# Patient Record
Sex: FEMALE | Race: BLACK OR AFRICAN AMERICAN | NOT HISPANIC OR LATINO | URBAN - METROPOLITAN AREA
[De-identification: names, ages, dates, MRNs, and addresses within clinical notes are randomized per-mention and may not be internally consistent; named-entity substitution may affect disease eponyms.]

---

## 2022-12-15 ENCOUNTER — EMERGENCY (EMERGENCY)
Facility: HOSPITAL | Age: 32
LOS: 1 days | Discharge: HOME | End: 2022-12-15
Attending: EMERGENCY MEDICINE | Admitting: EMERGENCY MEDICINE

## 2022-12-15 VITALS
TEMPERATURE: 98 F | RESPIRATION RATE: 18 BRPM | DIASTOLIC BLOOD PRESSURE: 60 MMHG | OXYGEN SATURATION: 100 % | SYSTOLIC BLOOD PRESSURE: 110 MMHG | HEART RATE: 71 BPM

## 2022-12-15 LAB
HCT VFR BLD CALC: 29.7 % — LOW (ref 34.5–45)
HGB BLD-MCNC: 9.1 G/DL — LOW (ref 11.5–15.5)
MCHC RBC-ENTMCNC: 25.5 PG — LOW (ref 27–34)
MCHC RBC-ENTMCNC: 30.6 GM/DL — LOW (ref 32–36)
MCV RBC AUTO: 83.2 FL — SIGNIFICANT CHANGE UP (ref 80–100)
NRBC # BLD: 0 /100 WBCS — SIGNIFICANT CHANGE UP (ref 0–0)
NRBC # FLD: 0 K/UL — SIGNIFICANT CHANGE UP (ref 0–0)
PLATELET # BLD AUTO: 266 K/UL — SIGNIFICANT CHANGE UP (ref 150–400)
RBC # BLD: 3.57 M/UL — LOW (ref 3.8–5.2)
RBC # FLD: 16.2 % — HIGH (ref 10.3–14.5)
WBC # BLD: 7.1 K/UL — SIGNIFICANT CHANGE UP (ref 3.8–10.5)
WBC # FLD AUTO: 7.1 K/UL — SIGNIFICANT CHANGE UP (ref 3.8–10.5)

## 2022-12-15 PROCEDURE — 99284 EMERGENCY DEPT VISIT MOD MDM: CPT

## 2022-12-15 NOTE — ED PROVIDER NOTE - PATIENT PORTAL LINK FT
You can access the FollowMyHealth Patient Portal offered by [facility] by registering at the following website: [email or web address]. By joining [employer]’s FollowMyHealth portal, you will also be able to view your health information using other applications (apps) compatible with our system.

## 2022-12-15 NOTE — ED PROVIDER NOTE - ATTENDING APP SHARED VISIT CONTRIBUTION OF CARE
Attending note:   After face to face evaluation of this patient, I concur with above noted hx, pe, and care plan for this patient.  Gardner: 32-year-old female presents to the ED with few days of vaginal bleeding.  Patient 4 days ago had hormonal IUD removed.  Patient has since had heavier than normal bleeding with some mild cramping.  Today patient felt lightheaded and dizzy and was going through multiple tampons so decided to come to the ED.  Based on the recommendation of [facility].  On exam patient is not pale and vitals are stable.  Patient has no significant suprapubic or abdominal tenderness.  Pelvic exam performed by SHWETHA Geronimo chaperoned by myself.  Shows small amount of bleeding in the vaginal canal with some small amount of bleeding from the cervical os.  Blood is dark and scant.  Given patient feeling lightheaded and dizzy can check CBC.  Patient symptoms are likely due to removal of hormonal IUD.  Patient to follow-up with GYN if bleeding or other symptoms continue for more than 1 week.

## 2022-12-15 NOTE — ED PROVIDER NOTE - PROGRESS NOTE DETAILS
SHWETHA Geronimo- Pt made aware of anemia on cbc. advised to f/u with PMD. strict return precautions discussed with pt.

## 2022-12-15 NOTE — ED PROVIDER NOTE - OBJECTIVE STATEMENT
33 y/o female no pmh c/o vaginal bleeding x 2 days. Pt states that she had her IUD removed x 4 days ago. IUD was a hormone IUD. Pt started to bleed x1 day ago which became heavier today. Pt admits to bleeding through 2 tampons and 1 pad today. Pt felt generalized weakness today. Called the GYN clinic where the IUD was removed and the nurse told her to come to the ER. Pt denies chest pain, sob, n/v/d, numbness, tingling, dizziness, syncope, fever or chills.

## 2022-12-15 NOTE — ED PROVIDER NOTE - CLINICAL SUMMARY MEDICAL DECISION MAKING FREE TEXT BOX
31 y/o female w/ vaginal bleeding after removal of hormonal IUD- bleeding minimal on exam, likely related to hormalon change from removal of IUD. Will check CBC and rec to f/u with GYN

## 2022-12-15 NOTE — ED PROVIDER NOTE - NS ED ATTENDING STATEMENT MOD
This was a shared visit with the IV[facility]N. I reviewed and verified the documentation and independently performed the documented:

## 2022-12-15 NOTE — ED ADULT NURSE NOTE - CHIEF COMPLAINT QUOTE
Pt states had IUD removed this past Monday, developed spotting of blood on Tuesday but yesterday into todat co increased bleeding, states went thought two tampons. Pt co lower pelvic pain and weakness. Pt denies sob or chest pain.
detailed exam

## 2022-12-15 NOTE — ED ADULT TRIAGE NOTE - CHIEF COMPLAINT QUOTE
Pt states had IUD removed this past Monday, developed spotting of blood on Tuesday but yesterday into todat co increased bleeding, states went thought two tampons. Pt co lower pelvic pain and weakness. Pt denies sob or chest pain.

## 2023-01-22 ENCOUNTER — EMERGENCY (EMERGENCY)
Facility: HOSPITAL | Age: 33
LOS: 1 days | Discharge: HOME | End: 2023-01-22
Attending: EMERGENCY MEDICINE | Admitting: EMERGENCY MEDICINE
Payer: MEDICAID

## 2023-01-22 VITALS
SYSTOLIC BLOOD PRESSURE: 120 MMHG | DIASTOLIC BLOOD PRESSURE: 66 MMHG | OXYGEN SATURATION: 100 % | RESPIRATION RATE: 17 BRPM | TEMPERATURE: 98 F | HEART RATE: 67 BPM

## 2023-01-22 VITALS
RESPIRATION RATE: 16 BRPM | TEMPERATURE: 99 F | OXYGEN SATURATION: 100 % | DIASTOLIC BLOOD PRESSURE: 78 MMHG | SYSTOLIC BLOOD PRESSURE: 133 MMHG | HEART RATE: 61 BPM

## 2023-01-22 DIAGNOSIS — Z90.49 ACQUIRED ABSENCE OF OTHER SPECIFIED PARTS OF DIGESTIVE TRACT: Chronic | ICD-10-CM

## 2023-01-22 DIAGNOSIS — Z90.3 ACQUIRED ABSENCE OF STOMACH [PART OF]: Chronic | ICD-10-CM

## 2023-01-22 DIAGNOSIS — Z98.890 OTHER SPECIFIED POSTPROCEDURAL STATES: Chronic | ICD-10-CM

## 2023-01-22 PROCEDURE — 99284 EMERGENCY DEPT VISIT MOD MDM: CPT

## 2023-01-22 PROCEDURE — 74176 CT ABD & PELVIS W/O CONTRAST: CPT | Mod: 26,MA

## 2023-01-22 NOTE — ED PROVIDER NOTE - PATIENT PORTAL LINK FT
You can access the FollowMyHealth Patient Portal offered by [facility] by registering at the following website: [email or web address]. By joining [employer]’s FollowMyHealth portal, you will also be able to view your health information using other applications (apps) compatible with our system.

## 2023-01-22 NOTE — ED PROVIDER NOTE - PHYSICAL EXAMINATION
Well appearing, well nourished, awake, alert, oriented to person, place, time/situation and in no apparent distress.    Airway patent    Eyes without scleral injection. No jaundice.    Strong pulse.    Respirations unlabored.    Abdomen soft, non-tender, no guarding.    Spine appears normal, range of motion is not limited, no muscle or joint tenderness.  : chaperoned by Dr. Figueroa. scant blood, no IUD string felt, no CMT, no adnexal TTP.     Alert and oriented, no gross motor or sensory deficits.    Skin normal color for race, warm, dry and intact. No evidence of rash.

## 2023-01-22 NOTE — ED PROVIDER NOTE - NSFOLLOWUPINSTRUCTIONS_ED_ALL_ED_FT
Follow-up with your primary medical doctor.  Follow-up with your OB/GYN.  Return to the ER for any worsening pain, fevers, vomiting or any other concerning symptoms.

## 2023-01-22 NOTE — ED PROVIDER NOTE - OBJECTIVE STATEMENT
32-year-old female with PMHX of sciatica, past surgical history of a gastric sleeve, abdominoplasty, appendectomy presents to the ER concern that she had a IUD dislodged or still in place after she was told it was removed on December 12.  Patient reports she received an outpatient x-ray for her sciatic pain and was told there may be an IUD still in place.  Patient reports slight bleeding since the procedure on December 12 intermittently.  And intermittent right pelvic sticking pain.  Denies fevers, nausea, vomiting, diarrhea, constipation, dysuria, hematuria, abnormal vaginal discharge.

## 2023-01-22 NOTE — ED ADULT NURSE NOTE - OBJECTIVE STATEMENT
Received patient in Intake 6 c/o IUD showed in X-ray and patient had it removed it last month. Patient denies fever, chills, SOB, chest pain. Patient is A&Ox4, airway patent, breathing unlabored and even. Awaiting CT scan. Side rails up and safety maintained.

## 2023-01-22 NOTE — ED PROVIDER NOTE - PROGRESS NOTE DETAILS
CT scan impression no IUD visualized.  Surgical clips in the lower quadrant likely related to prior appendectomy and possible confused for intrauterine device and plain radiograph.

## 2023-01-22 NOTE — ED ADULT TRIAGE NOTE - CHIEF COMPLAINT QUOTE
states" I still have IUD  in me and I found out incidentally when I went to get X-ray taken for my sciatica," I was bleeding heavy in December and my IUD was removed on december 12 at [facility]. .c/o sciatica pain

## 2023-01-22 NOTE — ED PROVIDER NOTE - CLINICAL SUMMARY MEDICAL DECISION MAKING FREE TEXT BOX
Pam Figueroa MD attending physician.  Patient is a 32-year-old woman who comes into the emergency department with the complaint that she had an x-ray that showed she still had an IUD when she thought it had been removed.  She showed us a film that shows an IUD on the right side of the abdomen.  Patient has a benign exam soft abdomen no CMT no string from the uterus.  She denies any other complaints no fever abdominal pain issues with her stool or urine.  She does states she has had ongoing bleeding.  She was told at [facility] that they had taken out the IUD so she is concerned that it remains.  We discussed various approaches and that we do not remove the IUD in the emergency department.  We are able to offer a CAT scan to better localize the IUD so she can have that information if she so desires for outpatient planning.  She agreed to wait for the CAT scan.  I do not think it is required giving that she has a benign exam so if she decides to not wait that is okay to.  I am signing her out to the next team.  CT is pending.  We will also make sure she has a pregnancy test

## 2023-01-22 NOTE — ED PROVIDER NOTE - ATTENDING APP SHARED VISIT CONTRIBUTION OF CARE
Pam Figueroa MD attending physician.  Patient is a 32-year-old woman who comes into the emergency department with the complaint that she had an x-ray that showed she still had an IUD when she thought it had been removed.  She showed us a film that shows an IUD on the right side of the abdomen.  Patient has a benign exam soft abdomen no CMT no string from the uterus.  She denies any other complaints no fever abdominal pain issues with her stool or urine.  She does states she has had ongoing bleeding.  She was told at [facility] that they had taken out the IUD so she is concerned that it remains.  We discussed various approaches and that we do not remove the IUD in the emergency department.  We are able to offer a CAT scan to better localize the IUD so she can have that information if she so desires for outpatient planning.  She agreed to wait for the CAT scan.  I do not think it is required giving that she has a benign exam so if she decides to not wait that is okay to.  I am signing her out to the next team.  CT is pending.  We will also make sure she has a pregnancy test    Pam Figueroa MD attending physician.  I attest I performed a history and physical of the patient and discussed their management with the resident and or advanced care provider.  I reviewed the resident and/or ACP's note and agree with the documented findings and plan of care with the exception of my documented changes if any.  My medical decision making and observations are found above.

## 2023-01-23 PROBLEM — Z78.9 OTHER SPECIFIED HEALTH STATUS: Chronic | Status: ACTIVE | Noted: 2022-12-15

## 2024-11-11 NOTE — ED ADULT TRIAGE NOTE - PATIENT ON (OXYGEN DELIVERY METHOD)
Semaj Villanueva! I've been wanting to send this kid to you for a while but they were briefly lost to follow-up. 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 He's had a lot of different therapies and nothing has ever really worked. High volume enemas worked the best for keeping him clear, but he still had issues with continence (their biggest concern). I've been trying to get him into pelvic floor therapy to see if that might help a little with continence but I did set expectations with mom that there's a chance that his anal tone/control will never be normal. Mom is very knowledgeable and reasonable, and she says things like cecostomies have been brought up before. 
room air
ED